# Patient Record
Sex: MALE | Race: OTHER | NOT HISPANIC OR LATINO
[De-identification: names, ages, dates, MRNs, and addresses within clinical notes are randomized per-mention and may not be internally consistent; named-entity substitution may affect disease eponyms.]

---

## 2020-11-09 PROBLEM — Z00.00 ENCOUNTER FOR PREVENTIVE HEALTH EXAMINATION: Status: ACTIVE | Noted: 2020-11-09

## 2020-11-10 ENCOUNTER — APPOINTMENT (OUTPATIENT)
Dept: OTOLARYNGOLOGY | Facility: CLINIC | Age: 60
End: 2020-11-10
Payer: COMMERCIAL

## 2020-11-10 VITALS
DIASTOLIC BLOOD PRESSURE: 88 MMHG | HEIGHT: 72 IN | HEART RATE: 86 BPM | BODY MASS INDEX: 25.73 KG/M2 | WEIGHT: 190 LBS | SYSTOLIC BLOOD PRESSURE: 155 MMHG | TEMPERATURE: 97.6 F

## 2020-11-10 DIAGNOSIS — R07.0 PAIN IN THROAT: ICD-10-CM

## 2020-11-10 DIAGNOSIS — K21.9 GASTRO-ESOPHAGEAL REFLUX DISEASE W/OUT ESOPHAGITIS: ICD-10-CM

## 2020-11-10 DIAGNOSIS — J34.2 DEVIATED NASAL SEPTUM: ICD-10-CM

## 2020-11-10 DIAGNOSIS — J30.2 OTHER SEASONAL ALLERGIC RHINITIS: ICD-10-CM

## 2020-11-10 PROCEDURE — 92557 COMPREHENSIVE HEARING TEST: CPT

## 2020-11-10 PROCEDURE — 31575 DIAGNOSTIC LARYNGOSCOPY: CPT

## 2020-11-10 PROCEDURE — 92550 TYMPANOMETRY & REFLEX THRESH: CPT

## 2020-11-10 PROCEDURE — 99072 ADDL SUPL MATRL&STAF TM PHE: CPT

## 2020-11-10 PROCEDURE — 99204 OFFICE O/P NEW MOD 45 MIN: CPT | Mod: 25

## 2020-11-10 NOTE — HISTORY OF PRESENT ILLNESS
[de-identified] : 60 yr old male c/o tinnitus AD and pressure in the head and throat pain for a week.\par +occ mild lightheaded\par -hx otitis, sinusitis\par +seasonal allergy tx w OTC antihistamine\par min discolored mucous/PND\par +hx GERD, tx in the past

## 2020-11-10 NOTE — REASON FOR VISIT
[Subsequent Evaluation] : a subsequent evaluation for [FreeTextEntry2] : ringing in right ear, pressure

## 2020-11-10 NOTE — ASSESSMENT
[FreeTextEntry1] :  AD borderline WNL/mild sloping to mod HF SNHL w type A.  AS WNL w mild SNHL dip 4KHz w type  A\par \par Informed consent for steroids: We reviewed the risks of oral prednisone with include, but are not limited to: mood lability, irritability, appetite stimulation, anxiety, hypertension, difficulty sleeping, vivid dreams, hyperglycemia, cataracts, increased intra-ocular pressure, GI upset, increased bone turnover causing or exacerbating osteopenia, and risk of avascular neurosis of the hip and long bones. Verbal informed consent was obtained for the use of prednisone.\par \par diet/lifestyle mod instruction sheet given\par \par f/u 2 weeks

## 2020-11-10 NOTE — REVIEW OF SYSTEMS
[Ear Pain] : ear pain [Ear Itch] : ear itch [Snoring With Pauses] : snoring with pauses [Negative] : Heme/Lymph [As Noted in HPI] : as noted in HPI

## 2020-11-10 NOTE — PHYSICAL EXAM
[Normal] : mucosa is normal [Midline] : trachea located in midline position [] : septum deviated to the left

## 2020-11-24 ENCOUNTER — APPOINTMENT (OUTPATIENT)
Dept: OTOLARYNGOLOGY | Facility: CLINIC | Age: 60
End: 2020-11-24
Payer: COMMERCIAL

## 2020-11-24 VITALS
WEIGHT: 190 LBS | SYSTOLIC BLOOD PRESSURE: 145 MMHG | BODY MASS INDEX: 25.73 KG/M2 | TEMPERATURE: 97.2 F | HEIGHT: 72 IN | DIASTOLIC BLOOD PRESSURE: 80 MMHG

## 2020-11-24 DIAGNOSIS — H91.21 SUDDEN IDIOPATHIC HEARING LOSS, RIGHT EAR: ICD-10-CM

## 2020-11-24 DIAGNOSIS — H90.3 SENSORINEURAL HEARING LOSS, BILATERAL: ICD-10-CM

## 2020-11-24 DIAGNOSIS — H93.11 TINNITUS, RIGHT EAR: ICD-10-CM

## 2020-11-24 PROCEDURE — 92567 TYMPANOMETRY: CPT

## 2020-11-24 PROCEDURE — 99214 OFFICE O/P EST MOD 30 MIN: CPT

## 2020-11-24 PROCEDURE — 92557 COMPREHENSIVE HEARING TEST: CPT

## 2020-11-24 RX ORDER — ACETAMINOPHEN/DIPHENHYDRAMINE 500MG-25MG
81 TABLET ORAL
Qty: 30 | Refills: 0 | Status: ACTIVE | COMMUNITY
Start: 2020-11-20

## 2020-11-24 RX ORDER — OMEPRAZOLE 20 MG/1
20 CAPSULE, DELAYED RELEASE ORAL TWICE DAILY
Qty: 60 | Refills: 5 | Status: COMPLETED | COMMUNITY
Start: 2020-11-10 | End: 2020-11-24

## 2020-11-24 RX ORDER — ATORVASTATIN CALCIUM 10 MG/1
10 TABLET, FILM COATED ORAL
Refills: 0 | Status: ACTIVE | COMMUNITY

## 2020-11-24 RX ORDER — PREDNISONE 10 MG/1
10 TABLET ORAL
Qty: 57 | Refills: 0 | Status: COMPLETED | COMMUNITY
Start: 2020-11-10 | End: 2020-11-24

## 2020-11-24 RX ORDER — TICAGRELOR 60 MG/1
TABLET ORAL
Refills: 0 | Status: ACTIVE | COMMUNITY

## 2020-11-24 NOTE — REASON FOR VISIT
[Subsequent Evaluation] : a subsequent evaluation for [FreeTextEntry2] : follow up ringing in theear

## 2020-11-24 NOTE — HISTORY OF PRESENT ILLNESS
[de-identified] : 60 yr old male c/o tinnitus AD for 3 weeks\par +HA\par no dizzy or lightheadedness\par -hx otitis, sinusitis\par +seasonal allergy tx w OTC antihistamine\par completed 2 week taper of prednisone for SSNHL AD.  No subjective change in hearing or tinnitus

## 2020-11-24 NOTE — ASSESSMENT
[FreeTextEntry1] :  AS WNL w mild notch 4Khz w type A, AD mild SNHL w type A\par no improvement on prednisone\par \par MRI\par f/u after study is complete

## 2020-11-24 NOTE — REVIEW OF SYSTEMS
[As Noted in HPI] : as noted in HPI [Snoring With Pauses] : snoring with pauses [Ear Noises] : ear noises [Negative] : Nasal [Ear Pain] : no ear pain [Ear Itch] : no ear itch [Dizziness] : no dizziness

## 2021-02-03 ENCOUNTER — FORM ENCOUNTER (OUTPATIENT)
Age: 61
End: 2021-02-03

## 2021-02-04 ENCOUNTER — TRANSCRIPTION ENCOUNTER (OUTPATIENT)
Age: 61
End: 2021-02-04

## 2021-02-06 ENCOUNTER — OUTPATIENT (OUTPATIENT)
Dept: OUTPATIENT SERVICES | Facility: HOSPITAL | Age: 61
LOS: 1 days | End: 2021-02-06
Payer: COMMERCIAL

## 2021-02-06 ENCOUNTER — APPOINTMENT (OUTPATIENT)
Dept: DISASTER EMERGENCY | Facility: HOSPITAL | Age: 61
End: 2021-02-06

## 2021-02-06 VITALS — WEIGHT: 190.04 LBS | HEIGHT: 72 IN

## 2021-02-06 VITALS
SYSTOLIC BLOOD PRESSURE: 113 MMHG | RESPIRATION RATE: 18 BRPM | DIASTOLIC BLOOD PRESSURE: 79 MMHG | OXYGEN SATURATION: 98 % | HEART RATE: 67 BPM | TEMPERATURE: 98 F

## 2021-02-06 DIAGNOSIS — U07.1 COVID-19: ICD-10-CM

## 2021-02-06 PROCEDURE — M0239: CPT

## 2021-02-06 RX ORDER — BAMLANIVIMAB 35 MG/ML
700 INJECTION, SOLUTION INTRAVENOUS ONCE
Refills: 0 | Status: COMPLETED | OUTPATIENT
Start: 2021-02-06 | End: 2021-02-06

## 2021-02-06 RX ORDER — SODIUM CHLORIDE 9 MG/ML
250 INJECTION INTRAMUSCULAR; INTRAVENOUS; SUBCUTANEOUS
Refills: 0 | Status: DISCONTINUED | OUTPATIENT
Start: 2021-02-06 | End: 2021-02-20

## 2021-02-06 RX ADMIN — SODIUM CHLORIDE 25 MILLILITER(S): 9 INJECTION INTRAMUSCULAR; INTRAVENOUS; SUBCUTANEOUS at 10:14

## 2021-02-06 RX ADMIN — BAMLANIVIMAB 270 MILLIGRAM(S): 35 INJECTION, SOLUTION INTRAVENOUS at 09:15

## 2021-02-06 NOTE — CHART NOTE - NSCHARTNOTEFT_GEN_A_CORE
CC: Monoclonal Antibody Infusion/COVID 19 Positive  60yMale with PMHx borderline HTN, s/p 4 coronary stents and symptoms of HA, cough, fever    exam/findings:  T(C): 36.8 (02-06-21 @ 09:04), Max: 36.8 (02-06-21 @ 09:04)  HR: 72 (02-06-21 @ 09:04) (72 - 72)  BP: 147/84 (02-06-21 @ 09:04) (147/84 - 147/84)  RR: 18 (02-06-21 @ 09:04) (18 - 18)  SpO2: 98% (02-06-21 @ 09:04) (98% - 98%)      PE:   Appearance: NAD	  HEENT:   Normal oral mucosa,   Lymphatic: No lymphadenopathy  Cardiovascular: Normal S1 S2, No JVD, No murmurs, No edema  Respiratory: Lungs clear to auscultation	  Gastrointestinal:  Soft, Non-tender, + BS	  Skin: warm and dry  Neurologic: Non-focal  Extremities: Normal range of motion, no calf tenderness or edema    ASSESSMENT:  Pt is a 60y with PMHx of HTN, s/p 4 coronary stents, Covid 19 Positive with symptoms in the last 10 days, who presents to infusion center for Monoclonal antibody infusion Bamlanivimab  Symptoms/ Criteria: HA, cough, fever   Risk Profile includes: Age >55, CV disease     PLAN:  - infusion procedure explained to patient   -Consent for monoclonal antibody infusion obtained   - Risk & benifits discussed/all questions answered  -infuse  Bamlanivimab  700mg IV over one hour   -observe patient for one hour post infusion      I have reviewed the Bamlanivimab Emergency Use Authorization (EAU) and I have provided the patient or patient's caregiver with the following information:  1. FDA has authorized emergency use of Bamlanivimab, which is not FDA-approved biologic product.  2. The patient or patient's caregiver has the option to accept or refuse administration of Bamlanivimab  3. The significant known and benefits are unknown.  4. Information on available alternative treatments and risks and benefits of those alternatives.    Discharge:  Patient tolerated infusion well denies complaints of chest pain/SOB/dizziness/ palps  Vital signs stable  D/C instructions given/ fact sheet included.  Patient to follow-up with PCP as needed.

## 2021-02-07 ENCOUNTER — TRANSCRIPTION ENCOUNTER (OUTPATIENT)
Age: 61
End: 2021-02-07